# Patient Record
Sex: MALE | Race: WHITE | NOT HISPANIC OR LATINO
[De-identification: names, ages, dates, MRNs, and addresses within clinical notes are randomized per-mention and may not be internally consistent; named-entity substitution may affect disease eponyms.]

---

## 2020-08-12 ENCOUNTER — ERX REFILL RESPONSE (OUTPATIENT)
Age: 60
End: 2020-08-12

## 2020-08-12 RX ORDER — ESOMEPRAZOLE MAGNESIUM 40 MG/1
TAKE 1 CAPSULE (40 MG) BY ORAL ROUTE 2 TIMES PER DAY FOR 90 DAYS CAPSULE, DELAYED RELEASE ORAL
Qty: 180 | Refills: 0

## 2020-12-06 ENCOUNTER — ERX REFILL RESPONSE (OUTPATIENT)
Age: 60
End: 2020-12-06

## 2020-12-06 RX ORDER — ESOMEPRAZOLE MAGNESIUM 40 MG/1
TAKE 1 CAPSULE (40 MG) BY ORAL ROUTE 2 TIMES PER DAY FOR 90 DAYS CAPSULE, DELAYED RELEASE ORAL
Qty: 180 | Refills: 0

## 2021-02-18 ENCOUNTER — ERX REFILL RESPONSE (OUTPATIENT)
Age: 61
End: 2021-02-18

## 2021-02-18 RX ORDER — ESOMEPRAZOLE MAGNESIUM 40 MG/1
TAKE 1 CAPSULE (40 MG) BY ORAL ROUTE 2 TIMES PER DAY FOR 90 DAYS CAPSULE, DELAYED RELEASE ORAL
Qty: 180 | Refills: 0

## 2021-06-06 ENCOUNTER — ERX REFILL RESPONSE (OUTPATIENT)
Dept: URBAN - METROPOLITAN AREA CLINIC 74 | Facility: CLINIC | Age: 61
End: 2021-06-06

## 2021-06-06 RX ORDER — ESOMEPRAZOLE MAGNESIUM 40 MG/1
TAKE 1 CAPSULE (40 MG) BY ORAL ROUTE 2 TIMES PER DAY FOR 90 DAYS CAPSULE, DELAYED RELEASE ORAL
Qty: 180 | Refills: 0

## 2021-08-22 ENCOUNTER — ERX REFILL RESPONSE (OUTPATIENT)
Dept: URBAN - METROPOLITAN AREA CLINIC 74 | Facility: CLINIC | Age: 61
End: 2021-08-22

## 2021-08-22 RX ORDER — ESOMEPRAZOLE MAGNESIUM 40 MG/1
TAKE 1 CAPSULE (40 MG) BY ORAL ROUTE 2 TIMES PER DAY CAPSULE, DELAYED RELEASE ORAL
Qty: 180 CAPSULE | Refills: 1 | OUTPATIENT

## 2021-08-22 RX ORDER — ESOMEPRAZOLE MAGNESIUM 40 MG/1
TAKE 1 CAPSULE (40 MG) BY ORAL ROUTE 2 TIMES PER DAY FOR 90 DAYS CAPSULE, DELAYED RELEASE ORAL
Qty: 180 | Refills: 0 | OUTPATIENT

## 2021-12-06 ENCOUNTER — ERX REFILL RESPONSE (OUTPATIENT)
Dept: URBAN - METROPOLITAN AREA CLINIC 74 | Facility: CLINIC | Age: 61
End: 2021-12-06

## 2021-12-06 RX ORDER — ESOMEPRAZOLE MAGNESIUM 40 MG/1
TAKE 1 CAPSULE (40 MG) BY ORAL ROUTE 2 TIMES PER DAY CAPSULE, DELAYED RELEASE ORAL
Qty: 180 CAPSULE | Refills: 1 | OUTPATIENT

## 2021-12-06 RX ORDER — ESOMEPRAZOLE MAGNESIUM 40 MG/1
TAKE 1 CAPSULE BY MOUTH TWICE DAILY CAPSULE, DELAYED RELEASE ORAL
Qty: 180 CAPSULE | Refills: 1 | OUTPATIENT

## 2022-03-13 ENCOUNTER — ERX REFILL RESPONSE (OUTPATIENT)
Dept: URBAN - METROPOLITAN AREA CLINIC 74 | Facility: CLINIC | Age: 62
End: 2022-03-13

## 2022-03-13 RX ORDER — ESOMEPRAZOLE MAGNESIUM 40 MG/1
TAKE 1 CAPSULE BY MOUTH TWICE DAILY 30 CAPSULE, DELAYED RELEASE ORAL
Qty: 180 CAPSULE | Refills: 1 | OUTPATIENT

## 2022-03-13 RX ORDER — ESOMEPRAZOLE MAGNESIUM 40 MG/1
TAKE 1 CAPSULE BY MOUTH TWICE DAILY CAPSULE, DELAYED RELEASE ORAL
Qty: 180 CAPSULE | Refills: 1 | OUTPATIENT

## 2022-05-05 ENCOUNTER — ERX REFILL RESPONSE (OUTPATIENT)
Dept: URBAN - METROPOLITAN AREA CLINIC 74 | Facility: CLINIC | Age: 62
End: 2022-05-05

## 2022-05-05 RX ORDER — ESOMEPRAZOLE MAGNESIUM 40 MG/1
TAKE 1 CAPSULE BY MOUTH TWICE DAILY CAPSULE, DELAYED RELEASE ORAL
Qty: 60 CAPSULE | Refills: 1 | OUTPATIENT

## 2022-05-05 RX ORDER — ESOMEPRAZOLE MAGNESIUM 40 MG/1
TAKE 1 CAPSULE BY MOUTH TWICE DAILY 30 CAPSULE, DELAYED RELEASE ORAL
Qty: 180 CAPSULE | Refills: 1 | OUTPATIENT

## 2022-05-24 ENCOUNTER — TELEPHONE ENCOUNTER (OUTPATIENT)
Dept: URBAN - METROPOLITAN AREA CLINIC 74 | Facility: CLINIC | Age: 62
End: 2022-05-24

## 2022-05-24 RX ORDER — ESOMEPRAZOLE MAGNESIUM 40 MG/1
TAKE 1 CAPSULE BY MOUTH TWICE DAILY CAPSULE, DELAYED RELEASE ORAL
Qty: 60 CAPSULE | Refills: 1

## 2022-08-03 ENCOUNTER — OFFICE VISIT (OUTPATIENT)
Dept: URBAN - METROPOLITAN AREA CLINIC 74 | Facility: CLINIC | Age: 62
End: 2022-08-03
Payer: OTHER GOVERNMENT

## 2022-08-03 ENCOUNTER — DASHBOARD ENCOUNTERS (OUTPATIENT)
Age: 62
End: 2022-08-03

## 2022-08-03 ENCOUNTER — WEB ENCOUNTER (OUTPATIENT)
Dept: URBAN - METROPOLITAN AREA CLINIC 74 | Facility: CLINIC | Age: 62
End: 2022-08-03

## 2022-08-03 ENCOUNTER — LAB OUTSIDE AN ENCOUNTER (OUTPATIENT)
Dept: URBAN - METROPOLITAN AREA CLINIC 74 | Facility: CLINIC | Age: 62
End: 2022-08-03

## 2022-08-03 VITALS
HEIGHT: 70 IN | OXYGEN SATURATION: 97 % | DIASTOLIC BLOOD PRESSURE: 90 MMHG | HEART RATE: 93 BPM | SYSTOLIC BLOOD PRESSURE: 130 MMHG | BODY MASS INDEX: 26.05 KG/M2 | WEIGHT: 182 LBS | TEMPERATURE: 98.2 F

## 2022-08-03 DIAGNOSIS — K21.9 GASTROESOPHAGEAL REFLUX DISEASE, ESOPHAGITIS PRESENCE NOT SPECIFIED: ICD-10-CM

## 2022-08-03 DIAGNOSIS — Z86.010 HISTORY OF COLON POLYPS: ICD-10-CM

## 2022-08-03 DIAGNOSIS — K44.9 HIATAL HERNIA: ICD-10-CM

## 2022-08-03 DIAGNOSIS — K64.8 INTERNAL HEMORRHOIDS: ICD-10-CM

## 2022-08-03 DIAGNOSIS — Z87.19 HISTORY OF CHRONIC GASTRITIS: ICD-10-CM

## 2022-08-03 DIAGNOSIS — E66.3 OVERWEIGHT (BMI 25.0-29.9): ICD-10-CM

## 2022-08-03 PROBLEM — 162863004: Status: ACTIVE | Noted: 2022-08-03

## 2022-08-03 PROCEDURE — 99204 OFFICE O/P NEW MOD 45 MIN: CPT | Performed by: INTERNAL MEDICINE

## 2022-08-03 RX ORDER — ESOMEPRAZOLE MAGNESIUM 40 MG/1
1 CAPSULE CAPSULE, DELAYED RELEASE ORAL TWICE A DAY
Qty: 180 CAPSULE | Refills: 3

## 2022-08-03 RX ORDER — TADALAFIL 5 MG/1
TAKE 1 TABLET (5 MG) BY ORAL ROUTE ONCE DAILY TABLET, FILM COATED ORAL 1
Qty: 0 | Refills: 0 | Status: ACTIVE | COMMUNITY
Start: 1900-01-01

## 2022-08-03 RX ORDER — IBUPROFEN 200 MG/1
TAKE 2 TABLETS (400 MG) BY ORAL ROUTE 3 TIMES PER DAY WITH FOOD TABLET, COATED ORAL
Qty: 0 | Refills: 0 | Status: ACTIVE | COMMUNITY
Start: 1900-01-01

## 2022-08-03 RX ORDER — ESOMEPRAZOLE MAGNESIUM 40 MG/1
TAKE 1 CAPSULE BY MOUTH TWICE DAILY CAPSULE, DELAYED RELEASE ORAL
Qty: 60 CAPSULE | Refills: 1 | Status: ACTIVE | COMMUNITY

## 2022-08-03 NOTE — HPI-TODAY'S VISIT:
Today August 3, 2022 the patient returns for a follow-up visit, the patient was last seen in the office on August 7, 2019 with gastroesophageal reflux disease with esophagitis, heartburn, a hiatal hernia, chronic gastritis, history of colon polyps, internal hemorrhoids.  At the time the patient was taking esomeprazole 40 mg twice a day.  At the time of the last visit the patient complaint of acid reflux, he was not following antireflux measures and diet as instructed.  The patient was snacking in the evening and was unable to hold the esomeprazole at night.  The patient had an EGD in 2019 which showed erosive esophagitis, large hiatal hernia H. pylori negative chronic gastritis and normal duodenal mucosa.  At the time the patient also had a colonoscopy that showed an 8 mm sessile adenomatous rectal polyp and internal hemorrhoids.  The patient was advised to get a colonoscopy in 5 years.  The patient returns to the office stating that he ran out of his omeprazole about 3 weeks ago, once you run out within a couple days he started having severe gastroesophageal reflux and heartburn.  He denied having any dysphagia or odynophagia, he got Nexium over-the-counter was taking 2 at the time with some improvement.  The patient denies having any nausea or vomiting.  We discussed his 2019 upper endoscopy which revealed a medium to large size hiatal hernia as well as esophagitis with no evidence of Blount's.  The patient has been recommended to follow antireflux measures and diet will remain on esomeprazole 40 mg twice daily he should be scheduled to have an EGD for Blount's surveillance.  The patient's bowel movements remain normal, he denies having any diarrhea, constipation, rectal bleeding.  We also reviewed his 2019 colonoscopy which revealed a rectal adenomatous polyp.  The patient will be due to have a colonoscopy in 2024.  The patient will return for a follow-up visit after his EGD.  Benefits potential complications and alternatives to EGD were disclosed.

## 2022-09-12 ENCOUNTER — CLAIMS CREATED FROM THE CLAIM WINDOW (OUTPATIENT)
Dept: URBAN - METROPOLITAN AREA CLINIC 4 | Facility: CLINIC | Age: 62
End: 2022-09-12
Payer: OTHER GOVERNMENT

## 2022-09-12 ENCOUNTER — OFFICE VISIT (OUTPATIENT)
Dept: URBAN - METROPOLITAN AREA SURGERY CENTER 30 | Facility: SURGERY CENTER | Age: 62
End: 2022-09-12
Payer: OTHER GOVERNMENT

## 2022-09-12 DIAGNOSIS — K31.89 ACQUIRED DEFORMITY OF DUODENUM: ICD-10-CM

## 2022-09-12 DIAGNOSIS — K31.89 OTHER DISEASES OF STOMACH AND DUODENUM: ICD-10-CM

## 2022-09-12 DIAGNOSIS — K21.9 GASTRO-ESOPHAGEAL REFLUX DISEASE WITHOUT ESOPHAGITIS: ICD-10-CM

## 2022-09-12 DIAGNOSIS — K21.9 ACID REFLUX: ICD-10-CM

## 2022-09-12 PROCEDURE — G8907 PT DOC NO EVENTS ON DISCHARG: HCPCS | Performed by: INTERNAL MEDICINE

## 2022-09-12 PROCEDURE — 88305 TISSUE EXAM BY PATHOLOGIST: CPT | Performed by: PATHOLOGY

## 2022-09-12 PROCEDURE — 88312 SPECIAL STAINS GROUP 1: CPT | Performed by: PATHOLOGY

## 2022-09-12 PROCEDURE — 43239 EGD BIOPSY SINGLE/MULTIPLE: CPT | Performed by: INTERNAL MEDICINE
